# Patient Record
Sex: FEMALE | Race: WHITE | NOT HISPANIC OR LATINO | Employment: UNEMPLOYED
[De-identification: names, ages, dates, MRNs, and addresses within clinical notes are randomized per-mention and may not be internally consistent; named-entity substitution may affect disease eponyms.]

---

## 2017-07-01 ENCOUNTER — HEALTH MAINTENANCE LETTER (OUTPATIENT)
Age: 47
End: 2017-07-01

## 2024-06-06 ENCOUNTER — TRANSFERRED RECORDS (OUTPATIENT)
Dept: HEALTH INFORMATION MANAGEMENT | Facility: CLINIC | Age: 54
End: 2024-06-06

## 2024-08-01 ENCOUNTER — TRANSFERRED RECORDS (OUTPATIENT)
Dept: HEALTH INFORMATION MANAGEMENT | Facility: CLINIC | Age: 54
End: 2024-08-01
Payer: MEDICARE

## 2024-08-01 ENCOUNTER — MEDICAL CORRESPONDENCE (OUTPATIENT)
Dept: HEALTH INFORMATION MANAGEMENT | Facility: CLINIC | Age: 54
End: 2024-08-01
Payer: MEDICARE

## 2024-08-02 ENCOUNTER — TELEPHONE (OUTPATIENT)
Dept: OTHER | Facility: CLINIC | Age: 54
End: 2024-08-02
Payer: MEDICARE

## 2024-08-02 NOTE — TELEPHONE ENCOUNTER
Referral received via Fax on 8/1/24.    Pt referred to [facility] by Dr. Alfredo Isidro ([facility]) for radicular pain of right upper extremity.    Routing to scheduling to coordinate the following:  NEW VASCULAR PATIENT consult with Dr. Roque  Please schedule this at next available    Appt note:  Ref to [facility] by Dr. Alfredo Isidro ([facility]) for radicular pain of right upper extremity; notes sent to [facility] to be scanned.    BLAKE CaroN, RN, [facility] [place]

## 2024-08-22 ENCOUNTER — OFFICE VISIT (OUTPATIENT)
Dept: OTHER | Facility: CLINIC | Age: 54
End: 2024-08-22
Attending: INTERNAL MEDICINE
Payer: MEDICARE

## 2024-08-22 VITALS
SYSTOLIC BLOOD PRESSURE: 117 MMHG | WEIGHT: 143 LBS | BODY MASS INDEX: 23.08 KG/M2 | DIASTOLIC BLOOD PRESSURE: 78 MMHG | OXYGEN SATURATION: 96 % | HEART RATE: 106 BPM

## 2024-08-22 DIAGNOSIS — M79.601 PAIN OF RIGHT UPPER EXTREMITY: Primary | ICD-10-CM

## 2024-08-22 PROCEDURE — 99205 OFFICE O/P NEW HI 60 MIN: CPT | Performed by: INTERNAL MEDICINE

## 2024-08-22 PROCEDURE — 99213 OFFICE O/P EST LOW 20 MIN: CPT | Performed by: INTERNAL MEDICINE

## 2024-08-22 PROCEDURE — G2211 COMPLEX E/M VISIT ADD ON: HCPCS | Performed by: INTERNAL MEDICINE

## 2024-08-22 PROCEDURE — G0463 HOSPITAL OUTPT CLINIC VISIT: HCPCS | Performed by: INTERNAL MEDICINE

## 2024-08-22 NOTE — PROGRESS NOTES
[facility]        Patient is here for a consult.    Pt is currently taking no meds that would impact our treatment plan.    /78 (BP Location: Left arm, Patient Position: Chair, Cuff Size: Adult Regular)   Pulse 106   Wt 143 lb (64.9 kg)   SpO2 96%   BMI 23.08 kg/m      The provider has been notified that the patient has no concerns.     Questions patient would like addressed today are: N/A.    Refills are needed: N/A    Has homecare services and agency name:  Mary Kate Hilario MA
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
## 2024-08-23 ENCOUNTER — TELEPHONE (OUTPATIENT)
Dept: OTHER | Facility: CLINIC | Age: 54
End: 2024-08-23
Payer: MEDICARE

## 2024-08-23 DIAGNOSIS — M79.601 PAIN OF RIGHT UPPER EXTREMITY: Primary | ICD-10-CM

## 2024-08-23 NOTE — TELEPHONE ENCOUNTER
Routing to scheduling to coordinate the following:  Ultrasound bilateral upper extremity arterial   Ultrasound bilateral upper extremity venous   Please schedule this in in the next 2 weeks  Follow up in person with Dr. Roque in 1 month     Appt note: follow up to appt on 8/22/24. Arterial and venous upper extremity ultrasounds    Thank you  Kellen Murphy RN on 8/23/2024 at 11:35 AM

## 2024-08-23 NOTE — TELEPHONE ENCOUNTER
[facility]    Who is the name of the provider?     What is the location you see this provider at/preferred location?     Person calling / Facility: [facility] [place]    Phone number: [phone]    Nurse call back needed:     Reason for call: Please fax OV note from 08/22/24 to # [phone]      Pharmacy location: N/A    Outside Imaging: N/A    Can we leave a detailed message on this number? Y    Additional Info:

## 2024-08-26 NOTE — TELEPHONE ENCOUNTER
Faxed copy of office note as requested to [phone].    Confirm Right fax 12:46 pm, 8/26/24    Yasmeen Hilario MA

## 2024-09-03 ENCOUNTER — HOSPITAL ENCOUNTER (OUTPATIENT)
Dept: ULTRASOUND IMAGING | Facility: CLINIC | Age: 54
Discharge: HOME | End: 2024-09-03
Attending: INTERNAL MEDICINE
Payer: MEDICARE

## 2024-09-03 ENCOUNTER — TELEPHONE (OUTPATIENT)
Dept: OTHER | Facility: CLINIC | Age: 54
End: 2024-09-03
Payer: MEDICARE

## 2024-09-03 DIAGNOSIS — M79.601 PAIN OF RIGHT UPPER EXTREMITY: ICD-10-CM

## 2024-09-03 PROCEDURE — 93930 UPPER EXTREMITY STUDY: CPT

## 2024-09-03 PROCEDURE — 93971 EXTREMITY STUDY: CPT

## 2024-09-03 NOTE — TELEPHONE ENCOUNTER
[facility]    Who is the name of the provider?:  STEVE MANUEL   What is the location you see this provider at/preferred location?: [place]  Person calling / Facility: Rachel Cramer  Phone number:  [phone] (home)   Nurse call back needed:  yes     Reason for call:  Patient has some questions regarding some procedures that Doctor Mya requested she gets. Unless Doctor Mya feels that it is necessary.    Pharmacy location:  [id number] [place] [address] AT [facility] OF [address]  Outside Imaging: n/a   Can we leave a detailed message on this number?  YES     9/3/2024, 10:22 AM

## 2024-09-03 NOTE — TELEPHONE ENCOUNTER
Patient was seen in consultation on 8/22/24 for radicular pain of right upper extremity. The following was ordered:  Ultrasound bilateral upper extremity arterial   Ultrasound bilateral upper extremity venous   Please schedule this in in the next 2 weeks   Follow up in person with Dr. Roque in 1 month     Patient is requesting to delay the interscalene block at this time.  She has follow up at [facility] for some type of spine injection and has completed the TOS imaging US today.      Discussed that it is her choice to not proceed with the interscalene block.  Appears patient has additional questions and would be most appropriate to discuss these at the follow up visit with Dr. Roque.  Patient was in agreement with this plan and had no further questions.    Katerine Potts, BLAKEN, RN, [facility] [place]

## 2024-09-23 ENCOUNTER — OFFICE VISIT (OUTPATIENT)
Dept: OTHER | Facility: CLINIC | Age: 54
End: 2024-09-23
Attending: INTERNAL MEDICINE
Payer: MEDICARE

## 2024-09-23 VITALS
OXYGEN SATURATION: 97 % | DIASTOLIC BLOOD PRESSURE: 78 MMHG | BODY MASS INDEX: 23.24 KG/M2 | WEIGHT: 144 LBS | SYSTOLIC BLOOD PRESSURE: 112 MMHG | HEART RATE: 89 BPM

## 2024-09-23 DIAGNOSIS — M79.601 PAIN OF RIGHT UPPER EXTREMITY: ICD-10-CM

## 2024-09-23 PROCEDURE — 99214 OFFICE O/P EST MOD 30 MIN: CPT | Performed by: INTERNAL MEDICINE

## 2024-09-23 PROCEDURE — G2211 COMPLEX E/M VISIT ADD ON: HCPCS | Performed by: INTERNAL MEDICINE

## 2024-09-23 PROCEDURE — G0463 HOSPITAL OUTPT CLINIC VISIT: HCPCS | Performed by: INTERNAL MEDICINE

## 2024-09-23 NOTE — PROGRESS NOTES
[facility]        Patient is here for a follow up.    Pt is currently taking no meds that would impact our treatment plan.    /78 (BP Location: Right arm, Patient Position: Sitting, Cuff Size: Adult Regular)   Pulse 89   Wt 144 lb (65.3 kg)   SpO2 97%   BMI 23.24 kg/m      The provider has been notified that the patient has no concerns.     Questions patient would like addressed today are: N/A.    Refills are needed: No    Has homecare services and agency name:  Mary Kate Hernandez MA
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
## 2024-09-27 ENCOUNTER — TELEPHONE (OUTPATIENT)
Dept: OTHER | Facility: CLINIC | Age: 54
End: 2024-09-27
Payer: MEDICARE

## 2024-09-27 DIAGNOSIS — M79.601 PAIN OF RIGHT UPPER EXTREMITY: Primary | ICD-10-CM

## 2024-09-27 NOTE — TELEPHONE ENCOUNTER
Routing to scheduling to coordinate the following:    Follow up in office or virtually  Please schedule this the week of October 21st     Appt note: Follow up to 9/23/24; no labs/imaging needed    Carol Hernandez MA

## 2024-10-03 ENCOUNTER — TELEPHONE (OUTPATIENT)
Dept: OTHER | Facility: CLINIC | Age: 54
End: 2024-10-03
Payer: MEDICARE

## 2024-10-03 NOTE — TELEPHONE ENCOUNTER
[facility]    Who is the name of the provider?:  STEVE MANUEL   What is the location you see this provider at/preferred location?: [place]  Person calling / Facility: Rachelchayo Cramer  Phone number: [phone]   Nurse call back needed:  Yes     Reason for call:  Please call patient to discuss pain management referral to [facility], patient believes there might have been an error made on the referral preventing her from getting care in a timely manner.         10/3/2024, 2:26 PM

## 2024-10-04 NOTE — TELEPHONE ENCOUNTER
Returned call to Rachel who states [facility] claims the order is only for a consult and she is frustrated this is wrong. I discussed [facility] often overlooks these orders and it clearly states in the referral it is for a right scalene block and I will re-fax this to them with the order circles and starred. She will call [facility] later today to schedule.         Order re-faxed to       Ericka DUENAS RN    [facility]  Office: [phone]  Fax: [phone]

## 2024-10-21 NOTE — TELEPHONE ENCOUNTER
Patient called to cancel her appt with Dr Roque 10/22/24. The scalene block is schedule for 10/28/24 at [facility]. Patient is making sure her insurance will cover the block before she rescheduled and requested a follow up the first week of November. Flagging to follow up 11/1/24

## 2025-02-06 ENCOUNTER — TRANSFERRED RECORDS (OUTPATIENT)
Dept: HEALTH INFORMATION MANAGEMENT | Facility: CLINIC | Age: 55
End: 2025-02-06

## 2025-02-21 ENCOUNTER — TELEPHONE (OUTPATIENT)
Dept: OTHER | Facility: CLINIC | Age: 55
End: 2025-02-21
Payer: MEDICARE

## 2025-02-21 NOTE — TELEPHONE ENCOUNTER
[facility]ENTER    Who is the name of the provider?:  STEVE MANUEL   What is the location you see this provider at/preferred location?: [place]  Person calling / Facility: Rachel Cramer  Phone number:  [phone] (home)   Nurse call back needed:  no     Reason for call:  Patient received scalene block last week. Last OV with Mya was in October. Patient is wanting guidance on next steps. She is hoping to start PT.    Pharmacy location:  [facility] #[id number] - [place] [address] AT [facility] OF [address]  Outside Imaging: n/a   Can we leave a detailed message on this number?  YES     2/21/2025, 1:30 PM      Carol Hernandez MA

## 2025-02-24 NOTE — TELEPHONE ENCOUNTER
Per 09/23/2024 OV with Dr. Roque; patient advised to return to clinic in one month from [facility] vel[facility].     follow up to 09/23/24 office visit with Dr. Roque      Routing to scheduling to coordinate the following:  RETURN VASCULAR PATIENT consult with Dr. Roque  Please schedule this in approximately 3 weeks      Appt note:   follow up to 09/23/24 office visit with Dr. Roque; please ask patient to have report from [facility] vel[facility]quez faxed to [facility] for Dr. Roque

## 2025-03-20 ENCOUNTER — OFFICE VISIT (OUTPATIENT)
Dept: OTHER | Facility: CLINIC | Age: 55
End: 2025-03-20
Attending: INTERNAL MEDICINE
Payer: MEDICARE

## 2025-03-20 VITALS
WEIGHT: 146 LBS | OXYGEN SATURATION: 97 % | DIASTOLIC BLOOD PRESSURE: 80 MMHG | HEART RATE: 90 BPM | BODY MASS INDEX: 23.57 KG/M2 | SYSTOLIC BLOOD PRESSURE: 117 MMHG

## 2025-03-20 DIAGNOSIS — G54.0 NEUROGENIC THORACIC OUTLET SYNDROME: Primary | ICD-10-CM

## 2025-03-20 PROCEDURE — G0463 HOSPITAL OUTPT CLINIC VISIT: HCPCS | Performed by: INTERNAL MEDICINE
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
## 2025-03-20 NOTE — PROGRESS NOTES
[facility]        Patient is here for a follow up.    Pt is currently taking no meds that would impact our treatment plan.    /80 (BP Location: Right arm, Patient Position: Sitting, Cuff Size: Adult Regular)   Pulse 90   Wt 146 lb (66.2 kg)   SpO2 97%   BMI 23.57 kg/m      The provider has been notified that the patient has no concerns.     Questions patient would like addressed today are: N/A.    Refills are needed: N/A    Has homecare services and agency name:  Mary Kate Hernandez MA

## 2025-03-24 ENCOUNTER — TELEPHONE (OUTPATIENT)
Dept: OTHER | Facility: CLINIC | Age: 55
End: 2025-03-24
Payer: MEDICARE

## 2025-03-24 DIAGNOSIS — G54.0 NEUROGENIC THORACIC OUTLET SYNDROME: Primary | ICD-10-CM

## 2025-03-24 NOTE — TELEPHONE ENCOUNTER
Routing to scheduling to coordinate the following:    In person   Please schedule this in 2 months     Appt note: Follow up to 3/20/25    Ericka DUENAS RN    [facility] [place]  Office: [phone]  Fax: [phone]

## 2025-04-08 ENCOUNTER — THERAPY VISIT (OUTPATIENT)
Dept: PHYSICAL THERAPY | Facility: CLINIC | Age: 55
End: 2025-04-08
Payer: COMMERCIAL

## 2025-04-08 DIAGNOSIS — G54.0 NEUROGENIC THORACIC OUTLET SYNDROME: Primary | ICD-10-CM

## 2025-04-08 PROCEDURE — 97110 THERAPEUTIC EXERCISES: CPT | Mod: GP

## 2025-04-08 PROCEDURE — 97161 PT EVAL LOW COMPLEX 20 MIN: CPT | Mod: GP
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
## 2025-04-22 ENCOUNTER — THERAPY VISIT (OUTPATIENT)
Dept: PHYSICAL THERAPY | Facility: CLINIC | Age: 55
End: 2025-04-22
Payer: COMMERCIAL

## 2025-04-22 DIAGNOSIS — G54.0 NEUROGENIC THORACIC OUTLET SYNDROME: Primary | ICD-10-CM

## 2025-04-22 PROCEDURE — 97140 MANUAL THERAPY 1/> REGIONS: CPT | Mod: GP | Performed by: PHYSICAL THERAPIST

## 2025-04-22 PROCEDURE — 97110 THERAPEUTIC EXERCISES: CPT | Mod: GP | Performed by: PHYSICAL THERAPIST

## 2025-05-20 ENCOUNTER — THERAPY VISIT (OUTPATIENT)
Dept: PHYSICAL THERAPY | Facility: CLINIC | Age: 55
End: 2025-05-20
Payer: COMMERCIAL

## 2025-05-20 ENCOUNTER — TELEPHONE (OUTPATIENT)
Dept: OTHER | Facility: CLINIC | Age: 55
End: 2025-05-20

## 2025-05-20 DIAGNOSIS — G54.0 NEUROGENIC THORACIC OUTLET SYNDROME: Primary | ICD-10-CM

## 2025-05-20 PROCEDURE — 97140 MANUAL THERAPY 1/> REGIONS: CPT | Mod: GP | Performed by: PHYSICAL THERAPIST

## 2025-05-20 PROCEDURE — 97110 THERAPEUTIC EXERCISES: CPT | Mod: GP | Performed by: PHYSICAL THERAPIST

## 2025-05-20 NOTE — TELEPHONE ENCOUNTER
Left voicemail to reschedule her appointment on 5-22-25 to after she completes her 6 sessions of PT.

## 2025-05-20 NOTE — TELEPHONE ENCOUNTER
Please re-schedule pt so that she completes 6 sessions of PT prior to follow up with Dr Gisela DUENAS, RN    [facility][note 3]  Office: [phone]  Fax: [phone]

## 2025-05-20 NOTE — TELEPHONE ENCOUNTER
[facility] VASCULAR [facility]    Who is the name of the provider? Dr Roque     What is the location you see this provider at/preferred location? [place]    Person calling / Facility:     Phone number:  [phone]    Nurse call back needed:     Reason for call:  Pt stated that the number of times that Dr Roque wanted her to go to PT was 6 times. Pt  has only been there 3 times due to how far out they were booked. Would you like to still see her on 05/22/25 or have her wait until she completes her 6 visits?       Pharmacy location: N/A    Outside Imaging: N/A    Can we leave a detailed message on this number? Y    Additional Info:

## 2025-05-30 ENCOUNTER — THERAPY VISIT (OUTPATIENT)
Dept: PHYSICAL THERAPY | Facility: CLINIC | Age: 55
End: 2025-05-30
Payer: COMMERCIAL

## 2025-05-30 DIAGNOSIS — G54.0 NEUROGENIC THORACIC OUTLET SYNDROME: Primary | ICD-10-CM

## 2025-05-30 PROCEDURE — 97110 THERAPEUTIC EXERCISES: CPT | Mod: GP | Performed by: PHYSICAL THERAPIST

## 2025-05-30 PROCEDURE — 97140 MANUAL THERAPY 1/> REGIONS: CPT | Mod: GP | Performed by: PHYSICAL THERAPIST

## 2025-06-19 ENCOUNTER — THERAPY VISIT (OUTPATIENT)
Dept: PHYSICAL THERAPY | Facility: CLINIC | Age: 55
End: 2025-06-19
Payer: COMMERCIAL

## 2025-06-19 DIAGNOSIS — G54.0 NEUROGENIC THORACIC OUTLET SYNDROME: Primary | ICD-10-CM

## 2025-06-19 PROCEDURE — 97140 MANUAL THERAPY 1/> REGIONS: CPT | Mod: GP | Performed by: PHYSICAL THERAPIST

## 2025-06-19 PROCEDURE — 97110 THERAPEUTIC EXERCISES: CPT | Mod: GP | Performed by: PHYSICAL THERAPIST

## 2025-06-24 ENCOUNTER — THERAPY VISIT (OUTPATIENT)
Dept: PHYSICAL THERAPY | Facility: CLINIC | Age: 55
End: 2025-06-24
Payer: COMMERCIAL

## 2025-06-24 DIAGNOSIS — G54.0 NEUROGENIC THORACIC OUTLET SYNDROME: Primary | ICD-10-CM

## 2025-06-24 PROCEDURE — 97110 THERAPEUTIC EXERCISES: CPT | Mod: GP | Performed by: PHYSICAL THERAPIST

## 2025-06-24 PROCEDURE — 97140 MANUAL THERAPY 1/> REGIONS: CPT | Mod: GP | Performed by: PHYSICAL THERAPIST

## 2025-06-26 ENCOUNTER — THERAPY VISIT (OUTPATIENT)
Dept: PHYSICAL THERAPY | Facility: CLINIC | Age: 55
End: 2025-06-26
Payer: COMMERCIAL

## 2025-06-26 DIAGNOSIS — G54.0 NEUROGENIC THORACIC OUTLET SYNDROME: Primary | ICD-10-CM

## 2025-06-27 NOTE — PROGRESS NOTES
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   Progress Note/Certification   Start of Care Date 04/08/25   Onset of illness/injury or Date of Surgery 03/20/25   Therapy Frequency 1x/week for 4 weeks decreasing to every other week   Predicted Duration 12 weeks   Certification date from 04/08/25   Certification date to 07/06/25   Progress Note Due Date 06/26/25   Progress Note Completed Date 04/08/25   PT Goal 1   Goal Identifier reaching   Goal Description Pt will be able to reach overhead without increase in symptoms in order to reach high shelves without increase in symptoms   Rationale to maximize safety and independence with performance of ADLs and functional tasks   Goal Progress Patient able to reach overhead(abduction>flexion), but increased symptoms following   Target Date 07/01/25   PT Goal 2   Goal Identifier headaches   Goal Description pt will have headaches less than 4 days out of the week   Rationale to maximize safety and independence with performance of ADLs and functional tasks   Goal Progress Daily HA's remain are intermittently less intense(3/10)   Target Date 07/01/25   Subjective Report   Subjective Report Patient returns to clinic  reporting PL 5/10, reduced HA pain today compared to HA pain several days ago. Overall, there has been no lasting improvement since starting PT, rather temporary relief only.   Objective Measures   Objective Measures Objective Measure 1;Objective Measure 2;Objective Measure 3;Objective Measure 4   Objective Measure 1   Objective Measure AROM of R shoulder: flexion 145(improved from 75) and abduction 160(improved from 94), but with increased sx at end range and following(lingering)   Details PROM  "of R UE in clinic remains WNL with soft end feel today in clinic   Objective Measure 2   Objective Measure Poor sitting posture: FH, RS, increased thoracic kyphosis   Details Pt tends to hold R UE close to body with elbow flexed to 90 degrees-R subscapularis tightness and tenderness as a result   Objective Measure 3   Objective Measure MMT of R shoulder remains grossly 4/5   Details ULTT, ulnar and radial nerve tension testing on R remains positive   Objective Measure 4   Objective Measure + pauly on R <20 seconds   Treatment Interventions (PT)   Interventions Therapeutic Procedure/Exercise;Neuromuscular Re-education;Manual Therapy   Therapeutic Procedure/Exercise   Therapeutic Procedures: strength, endurance, ROM, flexibility minutes (22053) 12   Therapeutic Procedures Ther Proc 2;Ther Proc 3;Ther Proc 4;Ther Proc 5   Ther Proc 1 Seated pulleys-flexion and abduction x5'   Ther Proc 2 UBE x6' F/B at 1.0 work load   Ther Proc 3 Supine: R shoulder P/AAROM, attempts gently stretch as end range all motions   Ther Proc 4 Reviewed R/L scalene stretching   Ther Proc 4 - Details 3x30\" each side, vc/mc for proper performance   Ther Proc 5 Self caudal glide-pt instructed seated   Ther Proc 5 - Details L side 3x30\"   PTRx Ther Proc 1 First Rib Self Mobilization-reviewed once again   PTRx Ther Proc 1 - Details 2x30 sec hold   PTRx Ther Proc 2 Scapular Retraction/Depression-reviewed   PTRx Ther Proc 2 - Details x10 with 5 sec hold   PTRx Ther Proc 3 Pec Stretch Doorway-reviewed   PTRx Ther Proc 3 - Details x30 seconds   Skilled Intervention Activity/ROM, strengthening   Patient Response/Progress Tolerated activity in clinic without increased sx following   Neuromuscular Re-education   Neuromuscular re-ed of mvmt, balance, coord, kinesthetic sense, posture, proprioception minutes (98528) 5   Neuromuscular Re-education Neuro Re-ed 2;Neuro Re-ed 3   Neuro Re-ed 1 Pull downs with RTB x15, vc/mc, instructed in use of knotted plastic " bag to attach TB to door   Neuro Re-ed 2 Reviewed Low rows with YTB x15   Neuro Re-ed 2 - Details vc/mc   Neuro Re-ed 3 B shoulder scap retaction with ER using YTB   Neuro Re-ed 3 - Details 2x10, nt   Skilled Intervention scap stab   Patient Response/Progress no sx aggravation   Manual Therapy   Manual Therapy: Mobilization, MFR, MLD, friction massage minutes (29015) 24   Manual Therapy Manual Therapy 2   Manual Therapy 1 Supine: R GHJ mobs, oscillation, caudal gliding   Manual Therapy 1 - Details R first ribs mobilzations, cervical P/A mobs, grade III SOR by PT   Manual Therapy 2 L SL: R scapular rot and lateral tilt mobs   Manual Therapy 2 - Details STM to R periscapular areas, lateral border of R scap(sub scap)   Skilled Intervention decreased sx, increase flexibility   Patient Response/Progress slightly improved HA following, decreased R upper quadrant muscle tension   Education   Learner/Method Patient;Listening;Reading;Demonstration;Pictures/Video;No Barriers to Learning   Education Comments Educated pt on presenting problem, plan of care, and HEP   Plan   Home program PTRx   Updates to plan of care Updated PTRx with pull downs   Plan for next session Progress scap stab as able to improve posture   Total Session Time   Timed Code Treatment Minutes 41   Total Treatment Time (sum of timed and untimed services) 41       PLAN  Continue therapy per current plan of care.  Patient seeing MD on 7/8/2025-will wait for further direction regarding future PT    Beginning/End Dates of Progress Note Reporting Period:  04/08/25 to 06/26/2025    Referring Provider:  Inder Roque

## 2025-07-14 PROBLEM — R10.13 DYSPEPSIA: Status: ACTIVE | Noted: 2020-11-19

## 2025-07-14 PROBLEM — K58.1 IRRITABLE BOWEL SYNDROME WITH CONSTIPATION: Status: ACTIVE | Noted: 2019-09-19

## 2025-07-14 PROBLEM — N18.31 STAGE 3A CHRONIC KIDNEY DISEASE (H): Status: ACTIVE | Noted: 2022-05-04

## 2025-07-14 PROBLEM — N95.2 POST-MENOPAUSAL ATROPHIC VAGINITIS: Status: ACTIVE | Noted: 2019-09-19

## 2025-07-15 ENCOUNTER — THERAPY VISIT (OUTPATIENT)
Dept: PHYSICAL THERAPY | Facility: CLINIC | Age: 55
End: 2025-07-15
Payer: COMMERCIAL

## 2025-07-15 DIAGNOSIS — G54.0 NEUROGENIC THORACIC OUTLET SYNDROME: Primary | ICD-10-CM

## 2025-07-15 PROCEDURE — 97110 THERAPEUTIC EXERCISES: CPT | Mod: GP | Performed by: PHYSICAL THERAPIST

## 2025-07-15 PROCEDURE — 97140 MANUAL THERAPY 1/> REGIONS: CPT | Mod: GP | Performed by: PHYSICAL THERAPIST
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
## 2025-07-17 ENCOUNTER — OFFICE VISIT (OUTPATIENT)
Dept: OTHER | Facility: CLINIC | Age: 55
End: 2025-07-17
Attending: INTERNAL MEDICINE
Payer: COMMERCIAL

## 2025-07-17 VITALS — SYSTOLIC BLOOD PRESSURE: 118 MMHG | HEART RATE: 77 BPM | OXYGEN SATURATION: 97 % | DIASTOLIC BLOOD PRESSURE: 81 MMHG

## 2025-07-17 DIAGNOSIS — G54.0 NEUROGENIC THORACIC OUTLET SYNDROME: ICD-10-CM

## 2025-07-17 PROCEDURE — G0463 HOSPITAL OUTPT CLINIC VISIT: HCPCS | Performed by: INTERNAL MEDICINE

## 2025-07-17 NOTE — PROGRESS NOTES
[facility]        Patient is here for a follow up.    Pt is currently taking no meds that would impact our treatment plan.    /81 (BP Location: Right arm, Patient Position: Sitting, Cuff Size: Adult Regular)   Pulse 77   SpO2 97%     The provider has been notified that the patient has no concerns.     Questions patient would like addressed today are: N/A.    Refills are needed: N/A    Has homecare services and agency name:  Mary Kate Hernandez MA

## 2025-07-21 ENCOUNTER — TELEPHONE (OUTPATIENT)
Dept: OTHER | Facility: CLINIC | Age: 55
End: 2025-07-21
Payer: COMMERCIAL

## 2025-07-21 NOTE — TELEPHONE ENCOUNTER
Referred by Dr Roque for JASON    Previous imaging completed (pertinent to referral):  See EPIC    Routing to scheduling to coordinate the following:    NEW VASCULAR PATIENT consult with Dr. Floyd  Please schedule this at next available    Appt note:  Referred by Dr Roque for JASON DUENAS, RN    [facility]  [facility] [facility]  Office: [phone]  Fax: [phone]

## 2025-07-24 ENCOUNTER — THERAPY VISIT (OUTPATIENT)
Dept: PHYSICAL THERAPY | Facility: CLINIC | Age: 55
End: 2025-07-24
Payer: COMMERCIAL

## 2025-07-24 DIAGNOSIS — G54.0 NEUROGENIC THORACIC OUTLET SYNDROME: Primary | ICD-10-CM

## 2025-08-14 ENCOUNTER — THERAPY VISIT (OUTPATIENT)
Dept: PHYSICAL THERAPY | Facility: CLINIC | Age: 55
End: 2025-08-14
Payer: COMMERCIAL

## 2025-08-14 DIAGNOSIS — G54.0 NEUROGENIC THORACIC OUTLET SYNDROME: Primary | ICD-10-CM

## 2025-08-19 ENCOUNTER — OFFICE VISIT (OUTPATIENT)
Dept: OTHER | Facility: CLINIC | Age: 55
End: 2025-08-19
Attending: SURGERY
Payer: COMMERCIAL

## 2025-08-19 ENCOUNTER — THERAPY VISIT (OUTPATIENT)
Dept: PHYSICAL THERAPY | Facility: CLINIC | Age: 55
End: 2025-08-19
Payer: COMMERCIAL

## 2025-08-19 VITALS — HEART RATE: 102 BPM | SYSTOLIC BLOOD PRESSURE: 102 MMHG | DIASTOLIC BLOOD PRESSURE: 69 MMHG

## 2025-08-19 DIAGNOSIS — G54.0 NEUROGENIC THORACIC OUTLET SYNDROME: Primary | ICD-10-CM

## 2025-08-19 DIAGNOSIS — G54.0 THORACIC OUTLET SYNDROME OF RIGHT THORACIC OUTLET: Primary | ICD-10-CM

## 2025-08-19 PROCEDURE — 99203 OFFICE O/P NEW LOW 30 MIN: CPT | Performed by: SURGERY

## 2025-08-19 PROCEDURE — 3078F DIAST BP <80 MM HG: CPT | Performed by: SURGERY

## 2025-08-19 PROCEDURE — 97140 MANUAL THERAPY 1/> REGIONS: CPT | Mod: GP | Performed by: PHYSICAL THERAPIST

## 2025-08-19 PROCEDURE — 3074F SYST BP LT 130 MM HG: CPT | Performed by: SURGERY

## 2025-08-19 PROCEDURE — 97110 THERAPEUTIC EXERCISES: CPT | Mod: GP | Performed by: PHYSICAL THERAPIST

## 2025-08-19 PROCEDURE — G0463 HOSPITAL OUTPT CLINIC VISIT: HCPCS | Performed by: SURGERY

## 2025-08-26 ENCOUNTER — TELEPHONE (OUTPATIENT)
Dept: OTHER | Facility: CLINIC | Age: 55
End: 2025-08-26
Payer: COMMERCIAL